# Patient Record
(demographics unavailable — no encounter records)

---

## 2025-02-19 NOTE — HISTORY OF PRESENT ILLNESS
[Lower back] : lower back [de-identified] : 02/19/2025: patient presents today for follow up visit for the L-spine. c/o worsening pain since last visit. has been going to a chiropractor.  Took NSAIDS with minimal relief.   04/24/2024: 32 yr old male c/o lower back pain that developed 5 yrs ago and had seen other providers. Had acupuncture, chiropractor, and PT.   Sine has had 3 severe episodes of back pain and spasm in last 3 years.   Last time it went out was about 1 month ago after lifting around house. Pain is worse with any lifting. Has severe spasm.  2 years ago had TPi that gave some relief.  Pain radiates down right leg to toes. No pain on left.  No  bowel or bladder symptoms.   PMH: None Works as a teacher    Last MRI 2 years ago.

## 2025-02-19 NOTE — ASSESSMENT
[FreeTextEntry1] : 32 M with Low back pain and spasm RLE radic recurred after bending forward  c/w chiro acupuncture trial  MRI L spine to assess HNP.  Had previous on MRI 2 years ago MDP TPI today

## 2025-02-19 NOTE — IMAGING
[de-identified] : Spine: Inspection/Palpation: No tenderness to palpation throughout Cervical/thoracic/lumbar spine. No bony stepoffs, No lesions.  Gait: antalgic, able to perform bilateral toe and heel rise.    Range of Motion: Lumbar Spine: Flexion to 90 degrees, Extension to 30 degrees, pain wrose with flexion.  Neurologic:  Bilateral Lower Extremities 5/5 Iliopsoas/Quadriceps/Hamstrings/ Tibialis Anterior/ Gastrocnemius. Extensor Hallucis Longus/ Flexor Hallucis Longus except    Sensation intact to light touch L2-S1  Patellar/ Achilles reflex within normal limits.   Negative straight leg raise  No pain with IR/ER/Flexion of HIps bilaterally   X-ray Ap/Lateral/Flexion/Extension of lumbar spine were viewed and interpreted.  Normal alignment is maintained without any spondylolisthesis.  L4-5 and L5-s1 dsic height loss

## 2025-02-19 NOTE — PROCEDURE
[Trigger point 1-2 muscle groups] : trigger point 1-2 muscle groups [Right] : of the right [Lumbar paraspinal muscle] : lumbar paraspinal muscle [Pain] : pain [Alcohol] : alcohol [Betadine] : betadine [Ethyl Chloride sprayed topically] : ethyl chloride sprayed topically [Sterile technique used] : sterile technique used [___ cc    1%] : Lidocaine ~Vcc of 1%  [___ cc    40mg] : Triamcinolone (Kenalog) ~Vcc of 40 mg  [] : Patient tolerated procedure well [Call if redness, pain or fever occur] : call if redness, pain or fever occur [Risks, benefits, alternatives discussed / Verbal consent obtained] : the risks benefits, and alternatives have been discussed, and verbal consent was obtained